# Patient Record
Sex: FEMALE | Race: OTHER | HISPANIC OR LATINO | Employment: FULL TIME | URBAN - METROPOLITAN AREA
[De-identification: names, ages, dates, MRNs, and addresses within clinical notes are randomized per-mention and may not be internally consistent; named-entity substitution may affect disease eponyms.]

---

## 2019-12-26 ENCOUNTER — APPOINTMENT (OUTPATIENT)
Dept: URGENT CARE | Facility: MEDICAL CENTER | Age: 44
End: 2019-12-26
Payer: OTHER MISCELLANEOUS

## 2019-12-26 PROCEDURE — G0382 LEV 3 HOSP TYPE B ED VISIT: HCPCS | Performed by: PHYSICIAN ASSISTANT

## 2019-12-26 PROCEDURE — 99283 EMERGENCY DEPT VISIT LOW MDM: CPT | Performed by: PHYSICIAN ASSISTANT

## 2019-12-30 ENCOUNTER — APPOINTMENT (OUTPATIENT)
Dept: URGENT CARE | Facility: MEDICAL CENTER | Age: 44
End: 2019-12-30
Payer: OTHER MISCELLANEOUS

## 2019-12-30 PROCEDURE — 99213 OFFICE O/P EST LOW 20 MIN: CPT | Performed by: PHYSICIAN ASSISTANT

## 2020-01-07 ENCOUNTER — APPOINTMENT (OUTPATIENT)
Dept: URGENT CARE | Facility: MEDICAL CENTER | Age: 45
End: 2020-01-07
Payer: OTHER MISCELLANEOUS

## 2020-01-07 ENCOUNTER — APPOINTMENT (OUTPATIENT)
Dept: RADIOLOGY | Facility: MEDICAL CENTER | Age: 45
End: 2020-01-07
Payer: OTHER MISCELLANEOUS

## 2020-01-07 DIAGNOSIS — M25.511 RIGHT SHOULDER PAIN, UNSPECIFIED CHRONICITY: Primary | ICD-10-CM

## 2020-01-07 DIAGNOSIS — M25.511 RIGHT SHOULDER PAIN, UNSPECIFIED CHRONICITY: ICD-10-CM

## 2020-01-07 PROCEDURE — 73030 X-RAY EXAM OF SHOULDER: CPT

## 2020-01-07 PROCEDURE — 99214 OFFICE O/P EST MOD 30 MIN: CPT | Performed by: FAMILY MEDICINE

## 2020-02-11 ENCOUNTER — APPOINTMENT (OUTPATIENT)
Dept: URGENT CARE | Facility: MEDICAL CENTER | Age: 45
End: 2020-02-11
Payer: OTHER MISCELLANEOUS

## 2020-02-11 PROCEDURE — 99213 OFFICE O/P EST LOW 20 MIN: CPT | Performed by: FAMILY MEDICINE

## 2020-02-12 ENCOUNTER — APPOINTMENT (OUTPATIENT)
Dept: URGENT CARE | Facility: MEDICAL CENTER | Age: 45
End: 2020-02-12
Payer: OTHER MISCELLANEOUS

## 2020-02-12 PROCEDURE — 99213 OFFICE O/P EST LOW 20 MIN: CPT | Performed by: PHYSICIAN ASSISTANT

## 2020-02-20 ENCOUNTER — APPOINTMENT (OUTPATIENT)
Dept: URGENT CARE | Facility: MEDICAL CENTER | Age: 45
End: 2020-02-20
Payer: OTHER MISCELLANEOUS

## 2020-02-20 PROCEDURE — 99213 OFFICE O/P EST LOW 20 MIN: CPT | Performed by: FAMILY MEDICINE

## 2020-02-26 ENCOUNTER — OFFICE VISIT (OUTPATIENT)
Dept: OBGYN CLINIC | Facility: MEDICAL CENTER | Age: 45
End: 2020-02-26
Payer: OTHER MISCELLANEOUS

## 2020-02-26 VITALS
WEIGHT: 156 LBS | SYSTOLIC BLOOD PRESSURE: 154 MMHG | DIASTOLIC BLOOD PRESSURE: 111 MMHG | HEART RATE: 109 BPM | HEIGHT: 66 IN | BODY MASS INDEX: 25.07 KG/M2

## 2020-02-26 DIAGNOSIS — S46.811A TRAPEZIUS MUSCLE STRAIN, RIGHT, INITIAL ENCOUNTER: Primary | ICD-10-CM

## 2020-02-26 PROCEDURE — 99243 OFF/OP CNSLTJ NEW/EST LOW 30: CPT | Performed by: ORTHOPAEDIC SURGERY

## 2020-02-26 RX ORDER — NAPROXEN SODIUM 550 MG/1
550 TABLET ORAL
COMMUNITY
Start: 2019-03-11 | End: 2020-03-10

## 2020-02-26 RX ORDER — PREDNISONE 50 MG/1
TABLET ORAL
COMMUNITY
Start: 2020-02-20

## 2020-02-26 NOTE — PROGRESS NOTES
Chief Complaint   Patient presents with    Right Shoulder - Pain           Assessment:  Chronic right trapezius muscle strain    Plan : This is not a good situation now 2 months after a work related lifting injury to the right shoulder  Her MRI showed no evidence of a rotator cuff tear; in fact her maximum tenderness is in the trapezius muscles over the shoulder blade, not in the joint itself  She has been working full duty without restrictions at work as there was no light duty available and she was never put on modified duty  This of course is making it much worse, not better  I feel she should be taken out of work for at least 2 weeks to allow this to calm down  I stopped the heat as I like ice to decrease pain and inflammation in a large trash bag or bag of frozen peas on the right shoulder blade for 15 minutes 4 times a day if needed for pain  She should take Advil 2-3 tablets, Aleve, or Tylenol if needed for pain  The physical therapy she is getting is not working for her, was not combined with any home exercises, and is not appropriate for her complaints  I started her on a trapezius home stretching exercise program today  I think that a change in venue of physical therapy with a different approach to this shoulder problem would be of some benefit due to her lack of improvement over the last 2 months  She will have to discuss this with Dr Regan Klein, her worker's comp doctor here, for his treatment suggestions    HPI: This is a 42-year-old  female presenting for orthopedic consultation, sent by work comp at [facility]  This is regarding her right shoulder injury sustained on 12/24/2019  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 crack in her shoulder  This is her dominant arm  Her pain is localized over the trapezius and extends to the subacromial bursa    A states she has been working on light duty way she is although these restrictions are difficult to adhere to due to the typical duties of her job  She has been treating with work comp at [facility], has had x-ray and MRI, an injection of Toradol and has been attending physical therapy  She also recently was placed on a 2-day dose of Prednisone  All interventions have done little to improve her symptoms  PMHx:         History reviewed  No pertinent past medical history  Past Surgical History:   Procedure Laterality Date     SECTION         History reviewed  No pertinent family history      Social History     Socioeconomic History    Marital status: Single     Spouse name: Not on file    Number of children: Not on file    Years of education: Not on file    Highest education level: Not on file   Occupational History    Not on file   Social Needs    Financial resource strain: Not on file    Food insecurity:     Worry: Not on file     Inability: Not on file    Transportation needs:     Medical: Not on file     Non-medical: Not on file   Tobacco Use    Smoking status: Never Smoker    Smokeless tobacco: Never Used   Substance and Sexual Activity    Alcohol use: Yes     Frequency: Monthly or less    Drug use: Not on file    Sexual activity: Not on file   Lifestyle    Physical activity:     Days per week: Not on file     Minutes per session: Not on file    Stress: Not on file   Relationships    Social connections:     Talks on phone: Not on file     Gets together: Not on file     Attends Anglican service: Not on file     Active member of club or organization: Not on file     Attends meetings of clubs or organizations: Not on file     Relationship status: Not on file    Intimate partner violence:     Fear of current or ex partner: Not on file     Emotionally abused: Not on file     Physically abused: Not on file     Forced sexual activity: Not on file   Other Topics Concern    Not on file   Social History Narrative    Not on file       Current Outpatient Medications Medication Sig Dispense Refill    naproxen sodium (ANAPROX) 550 mg tablet Take 550 mg by mouth      predniSONE 50 mg tablet        No current facility-administered medications for this visit  Allergies: Patient has no known allergies  ROS:  Positive for orthopedic complaints noted above  The remaining 11/12 systems on the intake sheet that I reviewed were negative  PE:  BP (!) 154/111   Pulse (!) 109   Ht 5' 6" (1 676 m)   Wt 70 8 kg (156 lb)   BMI 25 18 kg/m²   Constitutional: The patient was  oriented to person, place, and time  Well-developed and well-nourished  In acute distress  HEENT: Vision intact  Hearing normal  Swallowing normal   Head: Normocephalic  Cardiovascular: Intact distal pulses  Pulse regular  Pulmonary/Chest: Effort normal  No respiratory distress  Neurological: Alert and oriented to person, place, and time  Skin: Skin is warm  Psychiatric: Normal mood and affect  Ortho Exam:  On today's exam of the right shoulder, there was no swelling, ecchymosis, redness or signs infection  The skin is warm, dry and well perfused  The shoulder is clinically located  She was exquisitely tender to palpation through the right trapezius over to the subacromial bursa  She could forward flex the shoulder to 160° with pain  She could externally rotate to 60° and internally rotate equal bilaterally  True weakness was difficult to assess due to patient's pain  She had full elbow motion  She showed brisk biceps, triceps, brachioradialis reflexes  Radial pulse was intact on the right  Sensation light touch was normal in all 5 fingers  She had good  strength in the right hand    Studies reviewed:  I reviewed an outside MRI report only, not the films themselves of her right shoulder    This reported mild bursitis with no evidence of rotator cuff tear    Scribe Attestation    I,:   Quincy Velasquez MA am acting as a scribe while in the presence of the attending physician : I,:   Manoj Collazo MD personally performed the services described in this documentation    as scribed in my presence :

## 2020-02-26 NOTE — LETTER
February 26, 2020     MD Madhavi Ray [id number] [facility]    Patient: Erika Davis   YOB: 1975   Date of Visit: 2/26/2020       Dear Dr Orquidea Sequeira: Thank you for referring Erika Davis to me for evaluation  Below are my notes for this consultation  If you have questions, please do not hesitate to call me  I look forward to following your patient along with you  Sincerely,        Linnea Elise MD        CC: MD Linnea Sumner MD  2/26/2020  2:48 PM  Sign at close encounter  Chief Complaint   Patient presents with    Right Shoulder - Pain           Assessment:  Chronic right trapezius muscle strain    Plan : This is not a good situation now 2 months after a work related lifting injury to the right shoulder  Her MRI showed no evidence of a rotator cuff tear; in fact her maximum tenderness is in the trapezius muscles over the shoulder blade, not in the joint itself  She has been working full duty without restrictions at work as there was no light duty available and she was never put on modified duty  This of course is making it much worse, not better  I feel she should be taken out of work for at least 2 weeks to allow this to calm down  I stopped the heat as I like ice to decrease pain and inflammation in a large trash bag or bag of frozen peas on the right shoulder blade for 15 minutes 4 times a day if needed for pain  She should take Advil 2-3 tablets, Aleve, or Tylenol if needed for pain  The physical therapy she is getting is not working for her, was not combined with any home exercises, and is not appropriate for her complaints  I started her on a trapezius home stretching exercise program today  I think that a change in venue of physical therapy with a different approach to this shoulder problem would be of some benefit due to her lack of improvement over the last 2 months    She will have to discuss this with Dr Ashanti Martinez, her worker's comp doctor here, for his treatment suggestions    HPI: This is a 27-year-old  female presenting for orthopedic consultation, sent by work comp at [facility]  This is regarding her right shoulder injury sustained on 2019  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 crack in her shoulder  This is her dominant arm  Her pain is localized over the trapezius and extends to the subacromial bursa  A states she has been working on light duty way she is although these restrictions are difficult to adhere to due to the typical duties of her job  She has been treating with work comp at our [facility], has had x-ray and MRI, an injection of Toradol and has been attending physical therapy  She also recently was placed on a 2-day dose of Prednisone  All interventions have done little to improve her symptoms  PMHx:         History reviewed  No pertinent past medical history  Past Surgical History:   Procedure Laterality Date     SECTION         History reviewed  No pertinent family history      Social History     Socioeconomic History    Marital status: Single     Spouse name: Not on file    Number of children: Not on file    Years of education: Not on file    Highest education level: Not on file   Occupational History    Not on file   Social Needs    Financial resource strain: Not on file    Food insecurity:     Worry: Not on file     Inability: Not on file    Transportation needs:     Medical: Not on file     Non-medical: Not on file   Tobacco Use    Smoking status: Never Smoker    Smokeless tobacco: Never Used   Substance and Sexual Activity    Alcohol use: Yes     Frequency: Monthly or less    Drug use: Not on file    Sexual activity: Not on file   Lifestyle    Physical activity:     Days per week: Not on file     Minutes per session: Not on file    Stress: Not on file   Relationships    Social connections:     Talks on phone: Not on file     Gets together: Not on file     Attends Taoism service: Not on file     Active member of club or organization: Not on file     Attends meetings of clubs or organizations: Not on file     Relationship status: Not on file    Intimate partner violence:     Fear of current or ex partner: Not on file     Emotionally abused: Not on file     Physically abused: Not on file     Forced sexual activity: Not on file   Other Topics Concern    Not on file   Social History Narrative    Not on file       Current Outpatient Medications   Medication Sig Dispense Refill    naproxen sodium (ANAPROX) 550 mg tablet Take 550 mg by mouth      predniSONE 50 mg tablet        No current facility-administered medications for this visit  Allergies: Patient has no known allergies  ROS:  Positive for orthopedic complaints noted above  The remaining 11/12 systems on the intake sheet that I reviewed were negative  PE:  BP (!) 154/111   Pulse (!) 109   Ht 5' 6" (1 676 m)   Wt 70 8 kg (156 lb)   BMI 25 18 kg/m²    Constitutional: The patient was  oriented to person, place, and time  Well-developed and well-nourished  In acute distress  HEENT: Vision intact  Hearing normal  Swallowing normal   Head: Normocephalic  Cardiovascular: Intact distal pulses  Pulse regular  Pulmonary/Chest: Effort normal  No respiratory distress  Neurological: Alert and oriented to person, place, and time  Skin: Skin is warm  Psychiatric: Normal mood and affect  Ortho Exam:  On today's exam of the right shoulder, there was no swelling, ecchymosis, redness or signs infection  The skin is warm, dry and well perfused  The shoulder is clinically located  She was exquisitely tender to palpation through the right trapezius over to the subacromial bursa  She could forward flex the shoulder to 160° with pain  She could externally rotate to 60° and internally rotate equal bilaterally  True weakness was difficult to assess due to patient's pain  She had full elbow motion  She showed brisk biceps, triceps, brachioradialis reflexes  Radial pulse was intact on the right  Sensation light touch was normal in all 5 fingers  She had good  strength in the right hand    Studies reviewed:  I reviewed an outside MRI report only, not the films themselves of her right shoulder    This reported mild bursitis with no evidence of rotator cuff tear    Scribe Attestation    I,:   Delfino Yates MA am acting as a scribe while in the presence of the attending physician :        I,:   Elsa Irene MD personally performed the services described in this documentation    as scribed in my presence :

## 2020-02-26 NOTE — PATIENT INSTRUCTIONS
Plan :  This is not a good situation now 2 months after a work related lifting injury to the right shoulder  Her MRI showed no evidence of a rotator cuff tear; in fact her maximum tenderness is in the trapezius muscles over the shoulder blade, not in the joint itself  She has been working full duty without restrictions at work as there was no light duty available and she was never put on modified duty  This of course is making it much worse, not better  I feel she should be taken out of work for at least 2 weeks to allow this to calm down  I stopped the heat as I like ice to decrease pain and inflammation in a large trash bag or bag of frozen peas on the right shoulder blade for 15 minutes 4 times a day if needed for pain  She should take Advil 2-3 tablets, Aleve, or Tylenol if needed for pain  The physical therapy she is getting is not working for her, was not combined with any home exercises, and is not appropriate for her complaints  I started her on a trapezius home stretching exercise program today  I think that a change in venue of physical therapy with a different approach to this shoulder problem would be of some benefit due to her lack of improvement over the last 2 months    She will have to discuss this with Dr Romel Duffy, her worker's comp doctor here, for his treatment suggestions

## 2020-03-03 ENCOUNTER — APPOINTMENT (OUTPATIENT)
Dept: URGENT CARE | Facility: MEDICAL CENTER | Age: 45
End: 2020-03-03
Payer: OTHER MISCELLANEOUS

## 2020-03-03 PROCEDURE — 99213 OFFICE O/P EST LOW 20 MIN: CPT | Performed by: PREVENTIVE MEDICINE

## 2020-03-13 ENCOUNTER — APPOINTMENT (OUTPATIENT)
Dept: URGENT CARE | Facility: MEDICAL CENTER | Age: 45
End: 2020-03-13
Payer: OTHER MISCELLANEOUS

## 2020-03-13 PROCEDURE — 99213 OFFICE O/P EST LOW 20 MIN: CPT | Performed by: PREVENTIVE MEDICINE

## 2020-05-08 ENCOUNTER — APPOINTMENT (OUTPATIENT)
Dept: URGENT CARE | Facility: MEDICAL CENTER | Age: 45
End: 2020-05-08
Payer: OTHER MISCELLANEOUS

## 2020-05-08 PROCEDURE — 99213 OFFICE O/P EST LOW 20 MIN: CPT | Performed by: PREVENTIVE MEDICINE

## 2022-12-29 ENCOUNTER — APPOINTMENT (OUTPATIENT)
Dept: RADIOLOGY | Facility: MEDICAL CENTER | Age: 47
End: 2022-12-29

## 2022-12-29 ENCOUNTER — OCCMED (OUTPATIENT)
Dept: URGENT CARE | Facility: MEDICAL CENTER | Age: 47
End: 2022-12-29

## 2022-12-29 ENCOUNTER — TELEPHONE (OUTPATIENT)
Dept: OBGYN CLINIC | Facility: HOSPITAL | Age: 47
End: 2022-12-29

## 2022-12-29 DIAGNOSIS — M79.631 RIGHT FOREARM PAIN: ICD-10-CM

## 2022-12-29 DIAGNOSIS — M25.531 RIGHT WRIST PAIN: Primary | ICD-10-CM

## 2022-12-29 DIAGNOSIS — M25.531 RIGHT WRIST PAIN: ICD-10-CM

## 2022-12-29 NOTE — TELEPHONE ENCOUNTER
Caller: Daughter    Doctor: EDITH    Reason for call: mother injured @work  Hurt R wrist would like appointment   Daughter decided to take mother to ED    Call back#: [phone]

## 2023-01-03 ENCOUNTER — APPOINTMENT (OUTPATIENT)
Dept: URGENT CARE | Facility: MEDICAL CENTER | Age: 48
End: 2023-01-03

## 2023-01-10 ENCOUNTER — APPOINTMENT (OUTPATIENT)
Dept: URGENT CARE | Facility: MEDICAL CENTER | Age: 48
End: 2023-01-10

## 2023-01-20 ENCOUNTER — APPOINTMENT (OUTPATIENT)
Dept: URGENT CARE | Facility: MEDICAL CENTER | Age: 48
End: 2023-01-20

## 2023-02-17 ENCOUNTER — APPOINTMENT (OUTPATIENT)
Dept: URGENT CARE | Facility: MEDICAL CENTER | Age: 48
End: 2023-02-17

## 2023-03-02 ENCOUNTER — APPOINTMENT (OUTPATIENT)
Dept: URGENT CARE | Facility: MEDICAL CENTER | Age: 48
End: 2023-03-02

## 2023-05-11 ENCOUNTER — APPOINTMENT (OUTPATIENT)
Dept: URGENT CARE | Facility: MEDICAL CENTER | Age: 48
End: 2023-05-11

## 2023-06-07 ENCOUNTER — APPOINTMENT (OUTPATIENT)
Dept: RADIOLOGY | Facility: MEDICAL CENTER | Age: 48
End: 2023-06-07
Payer: OTHER MISCELLANEOUS

## 2023-06-07 ENCOUNTER — OCCMED (OUTPATIENT)
Dept: URGENT CARE | Facility: MEDICAL CENTER | Age: 48
End: 2023-06-07
Payer: OTHER MISCELLANEOUS

## 2023-06-07 DIAGNOSIS — S19.9XXA INJURY OF NECK, INITIAL ENCOUNTER: Primary | ICD-10-CM

## 2023-06-07 DIAGNOSIS — S49.91XA INJURY OF RIGHT SHOULDER, INITIAL ENCOUNTER: ICD-10-CM

## 2023-06-07 DIAGNOSIS — S99.921A INJURY OF RIGHT FOOT, INITIAL ENCOUNTER: ICD-10-CM

## 2023-06-07 DIAGNOSIS — S39.92XA INJURY OF BACK, INITIAL ENCOUNTER: ICD-10-CM

## 2023-06-07 DIAGNOSIS — S19.9XXA INJURY OF NECK, INITIAL ENCOUNTER: ICD-10-CM

## 2023-06-07 PROCEDURE — 72040 X-RAY EXAM NECK SPINE 2-3 VW: CPT

## 2023-06-07 PROCEDURE — 99213 OFFICE O/P EST LOW 20 MIN: CPT | Performed by: PHYSICIAN ASSISTANT

## 2023-06-07 PROCEDURE — 73630 X-RAY EXAM OF FOOT: CPT

## 2023-06-07 PROCEDURE — 72072 X-RAY EXAM THORAC SPINE 3VWS: CPT

## 2023-06-07 PROCEDURE — 72100 X-RAY EXAM L-S SPINE 2/3 VWS: CPT

## 2023-06-07 PROCEDURE — 73030 X-RAY EXAM OF SHOULDER: CPT

## 2023-06-13 ENCOUNTER — APPOINTMENT (OUTPATIENT)
Dept: URGENT CARE | Facility: MEDICAL CENTER | Age: 48
End: 2023-06-13
Payer: OTHER MISCELLANEOUS

## 2023-06-13 PROCEDURE — 99213 OFFICE O/P EST LOW 20 MIN: CPT | Performed by: PHYSICIAN ASSISTANT

## 2023-06-21 ENCOUNTER — APPOINTMENT (OUTPATIENT)
Dept: URGENT CARE | Facility: MEDICAL CENTER | Age: 48
End: 2023-06-21
Payer: OTHER MISCELLANEOUS

## 2023-06-21 PROCEDURE — 99213 OFFICE O/P EST LOW 20 MIN: CPT

## 2023-06-29 ENCOUNTER — APPOINTMENT (OUTPATIENT)
Dept: URGENT CARE | Facility: MEDICAL CENTER | Age: 48
End: 2023-06-29
Payer: OTHER MISCELLANEOUS

## 2023-06-29 PROCEDURE — 99213 OFFICE O/P EST LOW 20 MIN: CPT

## 2023-07-24 ENCOUNTER — APPOINTMENT (OUTPATIENT)
Dept: URGENT CARE | Facility: MEDICAL CENTER | Age: 48
End: 2023-07-24
Payer: OTHER MISCELLANEOUS

## 2023-07-24 PROCEDURE — 99213 OFFICE O/P EST LOW 20 MIN: CPT | Performed by: FAMILY MEDICINE

## 2023-07-27 ENCOUNTER — APPOINTMENT (OUTPATIENT)
Dept: URGENT CARE | Facility: MEDICAL CENTER | Age: 48
End: 2023-07-27
Payer: OTHER MISCELLANEOUS

## 2023-07-27 PROCEDURE — 99213 OFFICE O/P EST LOW 20 MIN: CPT

## 2023-09-19 ENCOUNTER — APPOINTMENT (OUTPATIENT)
Dept: URGENT CARE | Facility: MEDICAL CENTER | Age: 48
End: 2023-09-19
Payer: OTHER MISCELLANEOUS

## 2023-09-19 PROCEDURE — 99213 OFFICE O/P EST LOW 20 MIN: CPT | Performed by: FAMILY MEDICINE
